# Patient Record
Sex: FEMALE | Race: WHITE | NOT HISPANIC OR LATINO | Employment: UNEMPLOYED | URBAN - METROPOLITAN AREA
[De-identification: names, ages, dates, MRNs, and addresses within clinical notes are randomized per-mention and may not be internally consistent; named-entity substitution may affect disease eponyms.]

---

## 2021-09-17 ENCOUNTER — TELEPHONE (OUTPATIENT)
Dept: CARDIOLOGY | Facility: MEDICAL CENTER | Age: 61
End: 2021-09-17

## 2021-09-17 NOTE — TELEPHONE ENCOUNTER
Spoke to patient in regards to records for NP appointment with Dr. Garcia on 10/01/2021 at 08:20am. Patient has never been treated by a cardiologist, She also states she completed her blood work at [facility], and all cardiac imaging/ testing and EKGs done at [facility] back in 2013 maybe.    Confirmed appt date, time and location.    Pending records

## 2021-10-01 ENCOUNTER — OFFICE VISIT (OUTPATIENT)
Dept: CARDIOLOGY | Facility: MEDICAL CENTER | Age: 61
End: 2021-10-01
Payer: COMMERCIAL

## 2021-10-01 VITALS
SYSTOLIC BLOOD PRESSURE: 152 MMHG | WEIGHT: 228 LBS | HEART RATE: 84 BPM | BODY MASS INDEX: 35.79 KG/M2 | OXYGEN SATURATION: 97 % | HEIGHT: 67 IN | RESPIRATION RATE: 16 BRPM | DIASTOLIC BLOOD PRESSURE: 84 MMHG

## 2021-10-01 DIAGNOSIS — E78.5 DYSLIPIDEMIA: ICD-10-CM

## 2021-10-01 DIAGNOSIS — Z01.810 PREOPERATIVE CARDIOVASCULAR EXAMINATION: ICD-10-CM

## 2021-10-01 DIAGNOSIS — R07.89 OTHER CHEST PAIN: ICD-10-CM

## 2021-10-01 DIAGNOSIS — I10 STAGE 2 HYPERTENSION: ICD-10-CM

## 2021-10-01 PROCEDURE — 99204 OFFICE O/P NEW MOD 45 MIN: CPT | Performed by: INTERNAL MEDICINE

## 2021-10-01 PROCEDURE — 93000 ELECTROCARDIOGRAM COMPLETE: CPT | Performed by: INTERNAL MEDICINE

## 2021-10-01 RX ORDER — OMEPRAZOLE 20 MG/1
CAPSULE, DELAYED RELEASE ORAL
COMMUNITY
Start: 2021-08-31

## 2021-10-01 ASSESSMENT — ENCOUNTER SYMPTOMS
DIZZINESS: 0
DYSPNEA ON EXERTION: 0
SORE THROAT: 0
PARESTHESIAS: 0
DOUBLE VISION: 0
SHORTNESS OF BREATH: 0
ORTHOPNEA: 0
DIARRHEA: 1
NIGHT SWEATS: 0
BACK PAIN: 1
NECK PAIN: 1
HEADACHES: 1
FALLS: 1
NAUSEA: 0
SLEEP DISTURBANCES DUE TO BREATHING: 0
IRREGULAR HEARTBEAT: 0
SYNCOPE: 0
DIAPHORESIS: 0
PND: 0
WHEEZING: 0
NEAR-SYNCOPE: 0
EXCESSIVE DAYTIME SLEEPINESS: 0
MYALGIAS: 0
LIGHT-HEADEDNESS: 0
COUGH: 0
HEARTBURN: 1
LOSS OF BALANCE: 0
FLANK PAIN: 0
VOMITING: 0
DECREASED APPETITE: 0
PALPITATIONS: 0
BLOATING: 0
WEAKNESS: 0
FEVER: 0
CONSTIPATION: 0
NUMBNESS: 0
BLURRED VISION: 0

## 2021-10-01 NOTE — PROGRESS NOTES
Cardiology Initial Consultation Note    Date of note:    10/1/2021    Primary Care Provider: Pcp Pt States None  Referring Provider: CARINA Toribio     Patient Name: Kathrin Syed     YOB: 1960  MRN:              [id number]    Chief Complaint   Patient presents with   • Chest Pain     Dx: Chest Pains       History of Present Illness: Ms. Kathrin Syed is a 61 y.o. female whose current medical problems include HTN and HLD who is here for cardiac consultation for chest pain.    Patient states that she has been dealing with chest pain for the past 30 years. Happens 2-3 times/week and believes it is from acid reflux.  Can sometimes happen with stress, lasts for few seconds with self resolution.  Is usually substernal, sharp pain without radiation.  Had an episode yesterday while she was holding her granddaughter along with right arm shooting pain.    Was diagnosed with hypertension several years ago and was prescribed medication.  However, believes that her blood pressure increased after initiating medications and stopped taking it.  Is currently on no antihypertensive therapy.    In terms of physical activity, is not much active but is able to perform her ADLs.     Cardiovascular Risk Factors:  1. Smoking status: Never smoker  2. Type II Diabetes Mellitus: No   3. Hypertension: Yes  4. Dyslipidemia: Yes  5. Family history of early Coronary Artery Disease in a first degree relative (Male less than 55 years of age; Female less than 65 years of age): Denies  6.  Obesity and/or Metabolic Syndrome: BMI 35.7  7. Sedentary lifestyle: Yes      Review of Systems   Constitutional: Negative for decreased appetite, diaphoresis, fever, malaise/fatigue and night sweats.   HENT: Negative for congestion and sore throat.    Eyes: Negative for blurred vision and double vision.   Cardiovascular: Positive for chest pain. Negative for cyanosis, dyspnea on exertion, irregular heartbeat, leg swelling,  near-syncope, orthopnea, palpitations, paroxysmal nocturnal dyspnea and syncope.   Respiratory: Negative for cough, shortness of breath, sleep disturbances due to breathing and wheezing.    Endocrine: Negative for cold intolerance and heat intolerance.   Musculoskeletal: Positive for back pain, falls and neck pain. Negative for myalgias.   Gastrointestinal: Positive for diarrhea and heartburn. Negative for bloating, constipation, nausea and vomiting.   Genitourinary: Positive for frequency and urgency. Negative for dysuria and flank pain.   Neurological: Positive for headaches. Negative for excessive daytime sleepiness, dizziness, light-headedness, loss of balance, numbness, paresthesias and weakness.       Past Medical History:   Diagnosis Date   • Hyperlipidemia    • Hypertension        History reviewed. No pertinent surgical history.      Current Outpatient Medications   Medication Sig Dispense Refill   • omeprazole (PRILOSEC) 20 MG delayed-release capsule TAKE 1 CAPSULE BY MOUTH TWICE DAILY 30 MINUTES BEFORE BREAKFAST AND 30 MINUTES BEFORE DINNER MEAL     • IBUPROFEN IB PO Take  by mouth.       No current facility-administered medications for this visit.       Not on File      Family History   Problem Relation Age of Onset   • No Known Problems Mother    • No Known Problems Father          Social History     Socioeconomic History   • Marital status:      Spouse name: Not on file   • Number of children: Not on file   • Years of education: Not on file   • Highest education level: Not on file   Occupational History   • Not on file   Tobacco Use   • Smoking status: Never Smoker   • Smokeless tobacco: Never Used   Substance and Sexual Activity   • Alcohol use: Not Currently   • Drug use: Not on file   • Sexual activity: Not on file   Other Topics Concern   • Not on file   Social History Narrative   • Not on file     Social Determinants of Health     Financial Resource Strain:    • Difficulty of Paying Living  "Expenses:    Food Insecurity:    • Worried About Running Out of Food in the Last Year:    • Ran Out of Food in the Last Year:    Transportation Needs:    • Lack of Transportation (Medical):    • Lack of Transportation (Non-Medical):    Physical Activity:    • Days of Exercise per Week:    • Minutes of Exercise per Session:    Stress:    • Feeling of Stress :    Social Connections:    • Frequency of Communication with Friends and Family:    • Frequency of Social Gatherings with Friends and Family:    • Attends [employer]oration Services:    • Active Member of Clubs or Organizations:    • Attends Club or Organization Meetings:    • Marital Status:    Intimate Partner Violence:    • Fear of Current or Ex-Partner:    • Emotionally Abused:    • Physically Abused:    • Sexually Abused:          Physical Exam:  Ambulatory Vitals  /84 (BP Location: Left arm, Patient Position: Sitting, BP Cuff Size: Adult)   Pulse 84   Resp 16   Ht 1.702 m (5' 7\")   Wt 103 kg (228 lb)   SpO2 97%    Oxygen Therapy:  Pulse Oximetry: 97 %  BP Readings from Last 4 Encounters:   10/01/21 152/84       Weight/BMI: Body mass index is 35.71 kg/m².  Wt Readings from Last 4 Encounters:   10/01/21 103 kg (228 lb)       General: Well appearing and in no apparent distress  Eyes: nl conjunctiva, no icteric sclera  ENT: wearing a mask, normal external appearance of ears  Neck: no visible JVP,  no carotid bruits  Lungs: normal respiratory effort, CTAB  Heart: RRR, no murmurs, no rubs or gallops,  no edema bilateral lower extremities. No LV/RV heave on cardiac palpatation. 2+ bilateral radial pulses.  2+ bilateral dp pulses.   Abdomen: soft, non tender, non distended, no masses, normal bowel sounds.  No HSM.  Extremities/MSK: no clubbing, no cyanosis  Neurological: No focal sensory deficits  Psychiatric: Appropriate affect, A/O x 3, intact judgement and insight  Skin: Warm extremities      Lab Data Review:  No labs available to review    Cardiac Imaging " "and Procedures Review:    EKG dated 10/1/2021: My personal interpretation is sinus rhythm        Assessment & Plan     1. Other chest pain  EKG    NM-CARDIAC STRESS TEST    CANCELED: NM-CARDIAC STRESS TEST   2. Stage 2 hypertension  NM-CARDIAC STRESS TEST    CANCELED: NM-CARDIAC STRESS TEST   3. Dyslipidemia     4. Preoperative cardiovascular examination           Shared Medical Decision Making:  Obtain nuclear stress test given ongoing chest pain with some symptoms concerning for typical angina.  Patient is at an intermediate risk for heart disease given prolonged history of untreated hyperlipidemia and hypertension.    Blood pressure elevated in the clinic today.  Had extensive discussion regarding initiating medications.  However, patient wishes to try lifestyle modifications prior to initiating medications.     Discussed DASH diet, which is based on an eating plan low in sodium (daily sodium intake should be less than 2.3 g), rich in fruits and vegetables, and low-fat or non-fat dairy, with whole grains. It is a high fiber, low to moderate fat diet, rich in potasium, calcium, and magnesium.    Sodium can be reduced by:  1. Using sodium-free spices or flavorings with your food instead of salt  2. Not adding salt when cooking rice, pasta or hot cereal  3. Rinsing canned foods to remove some of the sodium  4. Buying foods labeled \"no salt added,\" \"sodium-free,\" \"low sodium\" or \"very low sodium\"  5. Decreased intake of processed, pre-packaged and frozen meals    If unable to decrease blood pressure with lifestyle modifications, will start antihypertensive therapy to which patient is in agreement with.    Had recent lab work done at [facility].  Will obtain records.  Has high cholesterol but is unsure of the numbers.  Not on a statin medication.    Discussed ACC/AHA guidelines of cardio focused exercise with reaching at least 85% of your maximum predicted heart rate for a minimum of 150 minutes/week to maintain " healthy weight and reduce cardiovascular risk factors for atherosclerotic heart disease.    Is scheduled to undergo colonoscopy for colon polyps.  Patient is at a low risk for cardiac complications for a low risk procedure.      All of patient's excellent questions were answered to the best of my knowledge and to her satisfaction.  It was a pleasure seeing Ms. Kathrin Syed in my clinic today. Return in about 3 months (around 1/1/2022). Patient is aware to call the cardiology clinic with any questions or concerns.      Migue Garcia MD  [facility]  [facility]  [address] [address]  Phone: [phone]  Fax: [phone]    Please note that this dictation was created using voice recognition software. I have made every reasonable attempt to correct obvious errors, but it is possible there are errors of grammar and possibly content that I did not discover before finalizing the note.

## 2021-10-02 LAB — EKG IMPRESSION: NORMAL

## 2022-02-24 ENCOUNTER — TELEPHONE (OUTPATIENT)
Dept: CARDIOLOGY | Facility: MEDICAL CENTER | Age: 62
End: 2022-02-24
Payer: COMMERCIAL

## 2022-02-24 NOTE — TELEPHONE ENCOUNTER
Called PT to schedule follow up with Dr. Garcia. PT said she would like to call back to schedule at a later time when she is feeling better.   SLC